# Patient Record
Sex: MALE | Race: BLACK OR AFRICAN AMERICAN | NOT HISPANIC OR LATINO | URBAN - NONMETROPOLITAN AREA
[De-identification: names, ages, dates, MRNs, and addresses within clinical notes are randomized per-mention and may not be internally consistent; named-entity substitution may affect disease eponyms.]

---

## 2024-04-17 ENCOUNTER — LAB (OUTPATIENT)
Dept: URBAN - NONMETROPOLITAN AREA CLINIC 2 | Facility: CLINIC | Age: 69
End: 2024-04-17

## 2024-04-17 ENCOUNTER — OV NP (OUTPATIENT)
Dept: URBAN - NONMETROPOLITAN AREA CLINIC 2 | Facility: CLINIC | Age: 69
End: 2024-04-17
Payer: MEDICARE

## 2024-04-17 VITALS
WEIGHT: 216.2 LBS | HEIGHT: 73 IN | HEART RATE: 91 BPM | BODY MASS INDEX: 28.65 KG/M2 | OXYGEN SATURATION: 95 % | DIASTOLIC BLOOD PRESSURE: 85 MMHG | SYSTOLIC BLOOD PRESSURE: 159 MMHG

## 2024-04-17 DIAGNOSIS — Z12.11 SCREEN FOR COLON CANCER: ICD-10-CM

## 2024-04-17 DIAGNOSIS — R11.0 NAUSEA: ICD-10-CM

## 2024-04-17 DIAGNOSIS — Z87.11 HISTORY OF PEPTIC ULCER DISEASE: ICD-10-CM

## 2024-04-17 DIAGNOSIS — Z78.9 DRUG INTOLERANCE: ICD-10-CM

## 2024-04-17 DIAGNOSIS — K57.10 DUODENAL DIVERTICULUM: ICD-10-CM

## 2024-04-17 PROBLEM — 305058001: Status: ACTIVE | Noted: 2024-04-17

## 2024-04-17 PROBLEM — 422587007: Status: ACTIVE | Noted: 2024-04-17

## 2024-04-17 PROBLEM — 266998003: Status: ACTIVE | Noted: 2024-04-17

## 2024-04-17 PROBLEM — 762275008: Status: ACTIVE | Noted: 2024-04-17

## 2024-04-17 PROBLEM — 59037007: Status: ACTIVE | Noted: 2024-04-17

## 2024-04-17 PROCEDURE — 99203 OFFICE O/P NEW LOW 30 MIN: CPT | Performed by: INTERNAL MEDICINE

## 2024-04-17 RX ORDER — ONDANSETRON 4 MG/1
TAKE 1 TABLET BY MOUTH EVERY 6 HOURS AS NEEDED IBS *INSURNACE COVERS 2/DAY* TABLET, FILM COATED ORAL
Qty: 60 EACH | Refills: 2 | Status: ACTIVE | COMMUNITY

## 2024-04-17 RX ORDER — QUETIAPINE FUMARATE 100 MG/1
TABLET, FILM COATED ORAL
Qty: 90 TABLET | Status: ACTIVE | COMMUNITY

## 2024-04-17 RX ORDER — PROMETHAZINE HYDROCHLORIDE 6.25 MG/5ML
TAKE 10 ML BY MOUTH THREE TIMES DAILY BEFORE MEALS SYRUP ORAL
Qty: 473 MILLILITER | Refills: 1 | Status: ACTIVE | COMMUNITY

## 2024-04-17 NOTE — HPI-TODAY'S VISIT:
69 yo M retired . lives w wife. 2 kids. one in [place]. one in [place]. has had nausea x 6mo. no overt abd pain...no vomiting...no assoc wt loss. saw jose s-egd/colon 2/2024- gastritis ow nl per pt. rxed w phenergan and zofran. no rx. now presents here. bm are qd. ED visit in [place] one mo ago for this c/o. labs nl and rxed w GI cocktail. says PPIs inc the nausea. ED visit 6/4/2023 for N,V,D. CT-duodenal diverticula, "haziness" assoc w SMA. liver/pancreas and GB were nl.

## 2024-05-29 ENCOUNTER — OFFICE VISIT (OUTPATIENT)
Dept: URBAN - NONMETROPOLITAN AREA CLINIC 2 | Facility: CLINIC | Age: 69
End: 2024-05-29